# Patient Record
(demographics unavailable — no encounter records)

---

## 2024-11-12 NOTE — HISTORY OF PRESENT ILLNESS
[de-identified] : Update 11/12/24: f/u CRS s/p FESS 10/30/24. Pathology: no fungus identified, chronic sinusitis with polyp formation, heavy mucus, negative for malignancy. Using saline irrigations BID. Has intermittent headache, taking tylenol. Sense of smell recovered mostly.  68F referred by Dr. Shrestha for CRS. She has history of sinus issues, worsened acutely in the past 3-5 months. She reports using flonase and in the past using DDM without help. CT completed showing pansinusitis. She has also completed antibiotics and steroids x 2 without relief. Reports sinus pain and pressure, swelling on the right side and headaches. Denies anterior rhinorrhea, PND, anosmia. History of recurrent sinus infections treated with antibiotics and feels better. Former smoker, stopped 8 years prior. No recent dental work. No afrin use >1 year.

## 2024-11-12 NOTE — PROCEDURE
[FreeTextEntry6] : Nasal Endoscopy: Bilateral nasal cavity debridement (CPT 29721)   Indication: post op FESS   Procedure: There was expected post operative inflammation in both the right and left nasal cavity. Thick mucoid secretions and blood clot were suctioned.   Left: The septum is midline The inferior turbinate was well reduced/outfractured The MT medialized in good position The max is clear The sphenoid is clear Ethmoid with mild edema along skull base The frontal outflow with thick debris, suctioned   Right: The septum is midline The inferior turbinate was well reduced/outfractured The MT medialized The max clear Sphenoid clear Ethmoid with minimal mucus, suctioned The frontal outflow tract was patent. Thick mucus suctioned

## 2024-11-12 NOTE — PROCEDURE
[FreeTextEntry6] : Nasal Endoscopy: Bilateral nasal cavity debridement (CPT 23648)   Indication: post op FESS   Procedure: There was expected post operative inflammation in both the right and left nasal cavity. Thick mucoid secretions and blood clot were suctioned.   Left: The septum is midline The inferior turbinate was well reduced/outfractured The MT medialized in good position The max is clear The sphenoid is clear Ethmoid with mild edema along skull base The frontal outflow with thick debris, suctioned   Right: The septum is midline The inferior turbinate was well reduced/outfractured The MT medialized The max clear Sphenoid clear Ethmoid with minimal mucus, suctioned The frontal outflow tract was patent. Thick mucus suctioned

## 2024-11-12 NOTE — HISTORY OF PRESENT ILLNESS
[de-identified] : Update 11/12/24: f/u CRS s/p FESS 10/30/24. Pathology: no fungus identified, chronic sinusitis with polyp formation, heavy mucus, negative for malignancy. Using saline irrigations BID. Has intermittent headache, taking tylenol. Sense of smell recovered mostly.  68F referred by Dr. Shrestha for CRS. She has history of sinus issues, worsened acutely in the past 3-5 months. She reports using flonase and in the past using DDM without help. CT completed showing pansinusitis. She has also completed antibiotics and steroids x 2 without relief. Reports sinus pain and pressure, swelling on the right side and headaches. Denies anterior rhinorrhea, PND, anosmia. History of recurrent sinus infections treated with antibiotics and feels better. Former smoker, stopped 8 years prior. No recent dental work. No afrin use >1 year.

## 2024-11-12 NOTE — REASON FOR VISIT
[Subsequent Evaluation] : a subsequent evaluation for [FreeTextEntry2] : referred by Dr. Shrestha for chronic sinusitis

## 2024-12-03 NOTE — HISTORY OF PRESENT ILLNESS
[de-identified] : Update 12/3/24: f/u CRS s/p FESS 10/30/24. Previously started on budesonide irrigations BID. Reports headaches and congestion worse on R side starting early this week. Sense of smell has still not returned. Has occasion. nasal congestion at night and uses flonase with relief.  Update 11/12/24: f/u CRS s/p FESS 10/30/24. Pathology: no fungus identified, chronic sinusitis with polyp formation, heavy mucus, negative for malignancy. Using saline irrigations BID. Has intermittent headache, taking tylenol. Sense of smell recovered mostly.  68F referred by Dr. Shrestha for CRS. She has history of sinus issues, worsened acutely in the past 3-5 months. She reports using flonase and in the past using DDM without help. CT completed showing pansinusitis. She has also completed antibiotics and steroids x 2 without relief. Reports sinus pain and pressure, swelling on the right side and headaches. Denies anterior rhinorrhea, PND, anosmia. History of recurrent sinus infections treated with antibiotics and feels better. Former smoker, stopped 8 years prior. No recent dental work. No afrin use >1 year.

## 2024-12-03 NOTE — ASSESSMENT
[FreeTextEntry1] : 68F presenting with CRS s/p FESS 10/30/24, now with acute sinusitis on the right.

## 2024-12-03 NOTE — PROCEDURE
[FreeTextEntry6] : Nasal Endoscopy: Bilateral nasal cavity debridement (CPT 58628)   Indication: post op FESS   Procedure: There was expected post operative inflammation in both the right and left nasal cavity. Acute sinusitis in the right.   Left: The septum is midline The inferior turbinate was well reduced/outfractured The MT medialized in good position The max is clear The sphenoid is clear Ethmoid with mild edema along skull base The frontal outflow patent   Right: The septum is midline The inferior turbinate was well reduced/outfractured The MT medialized The max with thick yellow frothy mucus, cultured. Mucosal edema Ethmoid with edema

## 2024-12-03 NOTE — PLAN
[TextEntry] : We will start antibiotics, I will call with the results of the culture if the antibiotics need to be changed. We will hold off on steroids for now.   Cont budesonide irrigations.  Follow up in 4-6 weeks.

## 2024-12-03 NOTE — PROCEDURE
[FreeTextEntry6] : Nasal Endoscopy: Bilateral nasal cavity debridement (CPT 11131)   Indication: post op FESS   Procedure: There was expected post operative inflammation in both the right and left nasal cavity. Acute sinusitis in the right.   Left: The septum is midline The inferior turbinate was well reduced/outfractured The MT medialized in good position The max is clear The sphenoid is clear Ethmoid with mild edema along skull base The frontal outflow patent   Right: The septum is midline The inferior turbinate was well reduced/outfractured The MT medialized The max with thick yellow frothy mucus, cultured. Mucosal edema Ethmoid with edema

## 2024-12-03 NOTE — HISTORY OF PRESENT ILLNESS
[de-identified] : Update 12/3/24: f/u CRS s/p FESS 10/30/24. Previously started on budesonide irrigations BID. Reports headaches and congestion worse on R side starting early this week. Sense of smell has still not returned. Has occasion. nasal congestion at night and uses flonase with relief.  Update 11/12/24: f/u CRS s/p FESS 10/30/24. Pathology: no fungus identified, chronic sinusitis with polyp formation, heavy mucus, negative for malignancy. Using saline irrigations BID. Has intermittent headache, taking tylenol. Sense of smell recovered mostly.  68F referred by Dr. Shrestha for CRS. She has history of sinus issues, worsened acutely in the past 3-5 months. She reports using flonase and in the past using DDM without help. CT completed showing pansinusitis. She has also completed antibiotics and steroids x 2 without relief. Reports sinus pain and pressure, swelling on the right side and headaches. Denies anterior rhinorrhea, PND, anosmia. History of recurrent sinus infections treated with antibiotics and feels better. Former smoker, stopped 8 years prior. No recent dental work. No afrin use >1 year.

## 2024-12-31 NOTE — HISTORY OF PRESENT ILLNESS
[de-identified] : Update 12/31/24: f/u CRS s/p FESS 10/30/24. Using budesonide irrigations BID. Last visit had R>L sinusitis, was started on cipro for culture showing pseudomonas. She was only able to take medication for 5.5 days as it caused dizziness/nausea. She also reports L ear pressure, has not popped after airplane descent. She has right forehead pressure.  Update 12/3/24: f/u CRS s/p FESS 10/30/24. Previously started on budesonide irrigations BID. Reports headaches and congestion worse on R side starting early this week. Sense of smell has still not returned. Has occasion. nasal congestion at night and uses flonase with relief.  Update 11/12/24: f/u CRS s/p FESS 10/30/24. Pathology: no fungus identified, chronic sinusitis with polyp formation, heavy mucus, negative for malignancy. Using saline irrigations BID. Has intermittent headache, taking tylenol. Sense of smell recovered mostly.  68F referred by Dr. Shrestha for CRS. She has history of sinus issues, worsened acutely in the past 3-5 months. She reports using flonase and in the past using DDM without help. CT completed showing pansinusitis. She has also completed antibiotics and steroids x 2 without relief. Reports sinus pain and pressure, swelling on the right side and headaches. Denies anterior rhinorrhea, PND, anosmia. History of recurrent sinus infections treated with antibiotics and feels better. Former smoker, stopped 8 years prior. No recent dental work. No afrin use >1 year.

## 2024-12-31 NOTE — PLAN
[TextEntry] : We will send off the culture and I will call her with the results of the culture. We will call with culture directed PO and irrigated abx.   Continue budesonide irrigations BID.

## 2024-12-31 NOTE — PROCEDURE
[Anterior rhinoscopy insufficient to account for symptoms] : anterior rhinoscopy insufficient to account for symptoms [None] : none [Rigid Endoscope] : examined with a rigid endoscope [de-identified] : Neo Guerrero [FreeTextEntry6] : Nasal Endoscopy: Bilateral nasal cavity debridement (CPT 01811)   Indication: post op FESS   Procedure: There was expected post operative inflammation in both the right and left nasal cavity. Acute sinusitis in the right.   Left: The septum is midline The inferior turbinate was well reduced/outfractured The MT medialized in good position The max is clear The sphenoid is clear Ethmoid with edema along skull base The frontal outflow not well visualized    Right: The septum is midline The inferior turbinate was well reduced/outfractured The MT medialized The max with thick yellow mucus, cultured. Mucosal edema Ethmoid with edema Sphenoid and frontal not well visualized

## 2025-02-17 NOTE — PLAN
[TextEntry] : Cultures again taken today.   Will call with cultures. Will consider either a prolonged course of PO abx and changed irrigated abx or IV abx.

## 2025-02-17 NOTE — HISTORY OF PRESENT ILLNESS
[de-identified] : Update 2/17/25: f/u CRS s/p FESS 10/30/24. Using budesonide irrigations BID, completed levaquin irrigations and PO levaquin x7 days. Still reports poor sense of smell and taste, R frontal pain. Previous culture from 12/31 growing pseudomonas.  Update 12/31/24: f/u CRS s/p FESS 10/30/24. Using budesonide irrigations BID. Last visit had R>L sinusitis, was started on cipro for culture showing pseudomonas. She was only able to take medication for 5.5 days as it caused dizziness/nausea. She also reports L ear pressure, has not popped after airplane descent. She has right forehead pressure.  Update 12/3/24: f/u CRS s/p FESS 10/30/24. Previously started on budesonide irrigations BID. Reports headaches and congestion worse on R side starting early this week. Sense of smell has still not returned. Has occasion. nasal congestion at night and uses flonase with relief.  Update 11/12/24: f/u CRS s/p FESS 10/30/24. Pathology: no fungus identified, chronic sinusitis with polyp formation, heavy mucus, negative for malignancy. Using saline irrigations BID. Has intermittent headache, taking tylenol. Sense of smell recovered mostly.  68F referred by Dr. Shrestha for CRS. She has history of sinus issues, worsened acutely in the past 3-5 months. She reports using flonase and in the past using DDM without help. CT completed showing pansinusitis. She has also completed antibiotics and steroids x 2 without relief. Reports sinus pain and pressure, swelling on the right side and headaches. Denies anterior rhinorrhea, PND, anosmia. History of recurrent sinus infections treated with antibiotics and feels better. Former smoker, stopped 8 years prior. No recent dental work. No afrin use >1 year.

## 2025-02-17 NOTE — PROCEDURE
[Anterior rhinoscopy insufficient to account for symptoms] : anterior rhinoscopy insufficient to account for symptoms [None] : none [Rigid Endoscope] : examined with a rigid endoscope [de-identified] : Neo Guerrero [de-identified] : Nasal Endoscopy: Bilateral nasal cavity debridement (CPT 92948)   Indication: post op FESS   Procedure: There was expected post operative inflammation in both the right and left nasal cavity. Acute sinusitis in the right.   Left: The septum is midline The inferior turbinate was well reduced/outfractured The MT medialized in good position The max is clear The sphenoid with thick mucus, cultured Ethmoid with edema along skull base The frontal outflow not well visualized    Right: The septum is midline The inferior turbinate was well reduced/outfractured The MT medialized The max clear Ethmoid with edema Sphenoid with edema and frontal not well visualized

## 2025-03-18 NOTE — HISTORY OF PRESENT ILLNESS
[de-identified] : Update 3/18/25: f/u CRS s/p FESS 10/30/24. Using budesonide irrigations BID. Started on levaquin 500mg qd x 3 weeks. Prior culture still with pseudomonas. She reports joint pain. She otherwise reports that sinuses are feeling slightly better, but can't tell if clear or not.  Update 2/17/25: f/u CRS s/p FESS 10/30/24. Using budesonide irrigations BID, completed levaquin irrigations and PO levaquin x7 days. Still reports poor sense of smell and taste, R frontal pain. Previous culture from 12/31 growing pseudomonas.  Update 12/31/24: f/u CRS s/p FESS 10/30/24. Using budesonide irrigations BID. Last visit had R>L sinusitis, was started on cipro for culture showing pseudomonas. She was only able to take medication for 5.5 days as it caused dizziness/nausea. She also reports L ear pressure, has not popped after airplane descent. She has right forehead pressure.  Update 12/3/24: f/u CRS s/p FESS 10/30/24. Previously started on budesonide irrigations BID. Reports headaches and congestion worse on R side starting early this week. Sense of smell has still not returned. Has occasion. nasal congestion at night and uses flonase with relief.  Update 11/12/24: f/u CRS s/p FESS 10/30/24. Pathology: no fungus identified, chronic sinusitis with polyp formation, heavy mucus, negative for malignancy. Using saline irrigations BID. Has intermittent headache, taking tylenol. Sense of smell recovered mostly.  68F referred by Dr. Shrestha for CRS. She has history of sinus issues, worsened acutely in the past 3-5 months. She reports using flonase and in the past using DDM without help. CT completed showing pansinusitis. She has also completed antibiotics and steroids x 2 without relief. Reports sinus pain and pressure, swelling on the right side and headaches. Denies anterior rhinorrhea, PND, anosmia. History of recurrent sinus infections treated with antibiotics and feels better. Former smoker, stopped 8 years prior. No recent dental work. No afrin use >1 year.

## 2025-03-18 NOTE — HISTORY OF PRESENT ILLNESS
[de-identified] : Update 3/18/25: f/u CRS s/p FESS 10/30/24. Using budesonide irrigations BID. Started on levaquin 500mg qd x 3 weeks. Prior culture still with pseudomonas. She reports joint pain. She otherwise reports that sinuses are feeling slightly better, but can't tell if clear or not.  Update 2/17/25: f/u CRS s/p FESS 10/30/24. Using budesonide irrigations BID, completed levaquin irrigations and PO levaquin x7 days. Still reports poor sense of smell and taste, R frontal pain. Previous culture from 12/31 growing pseudomonas.  Update 12/31/24: f/u CRS s/p FESS 10/30/24. Using budesonide irrigations BID. Last visit had R>L sinusitis, was started on cipro for culture showing pseudomonas. She was only able to take medication for 5.5 days as it caused dizziness/nausea. She also reports L ear pressure, has not popped after airplane descent. She has right forehead pressure.  Update 12/3/24: f/u CRS s/p FESS 10/30/24. Previously started on budesonide irrigations BID. Reports headaches and congestion worse on R side starting early this week. Sense of smell has still not returned. Has occasion. nasal congestion at night and uses flonase with relief.  Update 11/12/24: f/u CRS s/p FESS 10/30/24. Pathology: no fungus identified, chronic sinusitis with polyp formation, heavy mucus, negative for malignancy. Using saline irrigations BID. Has intermittent headache, taking tylenol. Sense of smell recovered mostly.  68F referred by Dr. Shrestha for CRS. She has history of sinus issues, worsened acutely in the past 3-5 months. She reports using flonase and in the past using DDM without help. CT completed showing pansinusitis. She has also completed antibiotics and steroids x 2 without relief. Reports sinus pain and pressure, swelling on the right side and headaches. Denies anterior rhinorrhea, PND, anosmia. History of recurrent sinus infections treated with antibiotics and feels better. Former smoker, stopped 8 years prior. No recent dental work. No afrin use >1 year.

## 2025-03-18 NOTE — PROCEDURE
[Anterior rhinoscopy insufficient to account for symptoms] : anterior rhinoscopy insufficient to account for symptoms [None] : none [Rigid Endoscope] : examined with a rigid endoscope [de-identified] : Neo Guerrero [de-identified] : Nasal Endoscopy: Bilateral nasal cavity debridement (CPT 25478)   Indication: post op FESS   Procedure: There was expected post operative inflammation in both the right and left nasal cavity. Acute sinusitis in the right.   Left: The septum is midline The inferior turbinate was well reduced/outfractured The MT medialized in good position The max is clear The sphenoid with clear  Minimal yellow mucus in ethmoid cavity, cultured, small polyp at anterior ethmoid  The frontal outflow not well visualized    Right: The septum is midline The inferior turbinate was well reduced/outfractured The MT medialized The max with minimal yellow mucus along floor  Ethmoid clear Sphenoid clear frontal not well visualized

## 2025-03-18 NOTE — PROCEDURE
[Anterior rhinoscopy insufficient to account for symptoms] : anterior rhinoscopy insufficient to account for symptoms [None] : none [Rigid Endoscope] : examined with a rigid endoscope [de-identified] : Neo Guerrero [de-identified] : Nasal Endoscopy: Bilateral nasal cavity debridement (CPT 64778)   Indication: post op FESS   Procedure: There was expected post operative inflammation in both the right and left nasal cavity. Acute sinusitis in the right.   Left: The septum is midline The inferior turbinate was well reduced/outfractured The MT medialized in good position The max is clear The sphenoid with clear  Minimal yellow mucus in ethmoid cavity, cultured, small polyp at anterior ethmoid  The frontal outflow not well visualized    Right: The septum is midline The inferior turbinate was well reduced/outfractured The MT medialized The max with minimal yellow mucus along floor  Ethmoid clear Sphenoid clear frontal not well visualized

## 2025-04-18 NOTE — HISTORY OF PRESENT ILLNESS
[de-identified] :  EVANS TEE  is a 69 year old female who presents for evaluation of chronic rhinosinusitis. Patient was referred by Dr Guerrero.  Pseudomonas after FESS   CHRONIC RHINOSINUSITIS Nasal polyposis- YES - She has had sinus symptoms since her 30s, she was told she had polyps. Her symptoms got worse in the spring of 2024. Patient has had 1 sinus surgeries, in 10/30/24.. Over the last year she received 5 courses of antibiotics and 3 courses of steroids. Her sense of smell is absent since surgery, her sense of smell was fine before the surgery. Her sense of taste is altered. Nasal steroids: budesonide 0.5 bid. Also putting levofloxacin into the sinus washes. = Path:  Chronic sinusitis with polyp formation; Heavy mucus  - She was tested for allergies a long time ago but cant recall what the result was. She takes zyrtec in the spring for seasonal allergic symptoms and it helps.  - she gets symptoms with cat and dog exposure; symptoms are more severe with cats- cause asthma attack     ASTHMA. - She has Albuterol  for PRN use, needs it 2/week in the spring, not as frequent other times of the year - in her 40s-50s- took Advair, then eventually tapered and stopped in part because medication was very expensive She was diagnosed with asthma at about 30 years of age  - no ER visits for asthma - she is a former smoker, quit 2016, smoked for ~ 40 years - about  she saw a pulmonologist who said that there was no sign of COPD/emphysema   No history of NSAIDs reactions. - She is on Aspirin 81 mg daily  - Last Advil- ~ 1 week ago   History of respiratory symptoms after alcohol ingestion: - red wine causes more nasal congestion next day, but no immediate symptoms    History of hives: only if dog licks her   History of GI bleeding or ulcers:  - had gastric ulcer at ~ 51 yo   Race/Ethnicity:  white       Labs from 3/18/25 reviewed, done at [facility]: - Absolute eosinophil count - 800  Immune work up revealed normal quantitative immunoglobulin levels, protective specific antibody titers to tetanus, HIB-3.89, normal CH50 levels. Laboratories are SIGNIFICANT for suboptimal and Strep. pneumoniae,.      - IgG-854, normal IgG subclasses   - MBL-467 - ACE-36, N

## 2025-04-18 NOTE — REASON FOR VISIT
[Home] : at home, [unfilled] , at the time of the visit. [Medical Office: ([facility])___] : at the medical office located in  [Telehealth (audio & video)] : This visit was provided via telehealth using real-time 2-way audio visual technology. [Verbal consent obtained from patient] : the patient, [unfilled]

## 2025-04-18 NOTE — REASON FOR VISIT
[Home] : at home, [unfilled] , at the time of the visit. [Medical Office: ([facility])___] : at the medical office located in  [Telehealth (audio & video)] : This visit was provided via telehealth using real-time 2-way audio visual technology. [Verbal consent obtained from patient] : the patient, [unfilled]

## 2025-04-18 NOTE — HISTORY OF PRESENT ILLNESS
[de-identified] :  EVANS TEE  is a 69 year old female who presents for evaluation of chronic rhinosinusitis. Patient was referred by Dr Guerrero.  Pseudomonas after FESS   CHRONIC RHINOSINUSITIS Nasal polyposis- YES - She has had sinus symptoms since her 30s, she was told she had polyps. Her symptoms got worse in the spring of 2024. Patient has had 1 sinus surgeries, in 10/30/24.. Over the last year she received 5 courses of antibiotics and 3 courses of steroids. Her sense of smell is absent since surgery, her sense of smell was fine before the surgery. Her sense of taste is altered. Nasal steroids: budesonide 0.5 bid. Also putting levofloxacin into the sinus washes. = Path:  Chronic sinusitis with polyp formation; Heavy mucus  - She was tested for allergies a long time ago but cant recall what the result was. She takes zyrtec in the spring for seasonal allergic symptoms and it helps.  - she gets symptoms with cat and dog exposure; symptoms are more severe with cats- cause asthma attack     ASTHMA. - She has Albuterol  for PRN use, needs it 2/week in the spring, not as frequent other times of the year - in her 40s-50s- took Advair, then eventually tapered and stopped in part because medication was very expensive She was diagnosed with asthma at about 30 years of age  - no ER visits for asthma - she is a former smoker, quit 2016, smoked for ~ 40 years - about  she saw a pulmonologist who said that there was no sign of COPD/emphysema   No history of NSAIDs reactions. - She is on Aspirin 81 mg daily  - Last Advil- ~ 1 week ago   History of respiratory symptoms after alcohol ingestion: - red wine causes more nasal congestion next day, but no immediate symptoms    History of hives: only if dog licks her   History of GI bleeding or ulcers:  - had gastric ulcer at ~ 49 yo   Race/Ethnicity:  white       Labs from 3/18/25 reviewed, done at [facility]: - Absolute eosinophil count - 800  Immune work up revealed normal quantitative immunoglobulin levels, protective specific antibody titers to tetanus, HIB-3.89, normal CH50 levels. Laboratories are SIGNIFICANT for suboptimal and Strep. pneumoniae,.      - IgG-854, normal IgG subclasses   - MBL-467 - ACE-36, N

## 2025-04-29 NOTE — HISTORY OF PRESENT ILLNESS
[de-identified] : Update 4/29/25: f/u CRS s/p FESS 10/30/24. Continues to use levaquin irrigations and budesonide. Last culture without growth. She overall is feeling improved. She reports that her allergies have caused nasal congestion. Sense of smell waxing and waning  Update 4/4/25: f/u CRS s/p FESS 10/30/24. Continues to use levaquin irrigations. Last seen 2 weeks ago. Culture from L max 3/18 showed no growth. Feeling overall stable. Taking zyrtec regularly for her AR. Using flonase on occasion. With L frontal DOMINGUEZ intermittently.  Update 3/18/25: f/u CRS s/p FESS 10/30/24. Using budesonide irrigations BID. Started on levaquin 500mg qd x 3 weeks. Prior culture still with pseudomonas. She reports joint pain. She otherwise reports that sinuses are feeling slightly better, but can't tell if clear or not.  Update 2/17/25: f/u CRS s/p FESS 10/30/24. Using budesonide irrigations BID, completed levaquin irrigations and PO levaquin x7 days. Still reports poor sense of smell and taste, R frontal pain. Previous culture from 12/31 growing pseudomonas.  Update 12/31/24: f/u CRS s/p FESS 10/30/24. Using budesonide irrigations BID. Last visit had R>L sinusitis, was started on cipro for culture showing pseudomonas. She was only able to take medication for 5.5 days as it caused dizziness/nausea. She also reports L ear pressure, has not popped after airplane descent. She has right forehead pressure.  Update 12/3/24: f/u CRS s/p FESS 10/30/24. Previously started on budesonide irrigations BID. Reports headaches and congestion worse on R side starting early this week. Sense of smell has still not returned. Has occasion. nasal congestion at night and uses flonase with relief.  Update 11/12/24: f/u CRS s/p FESS 10/30/24. Pathology: no fungus identified, chronic sinusitis with polyp formation, heavy mucus, negative for malignancy. Using saline irrigations BID. Has intermittent headache, taking tylenol. Sense of smell recovered mostly.  68F referred by Dr. Shrestha for CRS. She has history of sinus issues, worsened acutely in the past 3-5 months. She reports using flonase and in the past using DDM without help. CT completed showing pansinusitis. She has also completed antibiotics and steroids x 2 without relief. Reports sinus pain and pressure, swelling on the right side and headaches. Denies anterior rhinorrhea, PND, anosmia. History of recurrent sinus infections treated with antibiotics and feels better. Former smoker, stopped 8 years prior. No recent dental work. No afrin use >1 year.

## 2025-04-29 NOTE — PROCEDURE
[Anterior rhinoscopy insufficient to account for symptoms] : anterior rhinoscopy insufficient to account for symptoms [None] : none [Rigid Endoscope] : examined with a rigid endoscope [de-identified] : Neo Guerrero [de-identified] : Nasal Endoscopy: Bilateral nasal cavity   Indication: post op FESS     Left: The septum is midline The inferior turbinate was well reduced/outfractured The MT medialized in good position The max is clear, no mucopus Ethmoid with minimal thick mucus posteriorly, cultured today ethmoid edema resolved, small amount of edema at posterior ethmoid at face of sphenoid sphenoid clear The frontal outflow patent  Right: The septum is midline The inferior turbinate was well reduced/outfractured The MT medialized The max with small amount of edema Ethmoid with residual edema, removed with suction Sphenoid with residual edema frontal not well visualized 2/2 to edema

## 2025-04-29 NOTE — ASSESSMENT
[FreeTextEntry1] : 69F presenting with CRS s/p FESS 10/30/24, with persistent pseudmonas infection appears to have resolved.

## 2025-04-29 NOTE — PLAN
[TextEntry] : No evidence of active pseudomonal infection today. The left sided sinuses are nearly completely clear. There is polyp in the R nasal cavity, but the left sided infeciton appears to be resolved. We reviewed the utility of discussing with ID. We reivewed the role of pulm. At this point, I do not want to trial off irrigated abx as her right side is not yet optimized and if the infeciton returns, I would not want to start from an already imperfect sinus cavity. We will switch to irrigated zosyn.   Will fu culture taken today although little suspicion for infection.  Cont fu with Dr. Mares.  Will prescribe an inhaler. We reviewed d/cing prior to Dr. Mares appt in 2 months.   Follow up in 4-6 weeks.

## 2025-06-10 NOTE — PLAN
[TextEntry] : No evidence of active pseudomonal infection today. Cont budesonide irrigations.  Complete zosyn irrigations. Nothing to culture today.  Follow up in 6 weeks.

## 2025-06-10 NOTE — ASSESSMENT
[FreeTextEntry1] : 69F presenting with CRS s/p FESS 10/30/24, with persistent pseudomonas infection, now appears to have resolved

## 2025-06-10 NOTE — PROCEDURE
[Anterior rhinoscopy insufficient to account for symptoms] : anterior rhinoscopy insufficient to account for symptoms [None] : none [Rigid Endoscope] : examined with a rigid endoscope [de-identified] : Neo Guerrero [de-identified] : Nasal Endoscopy: Bilateral nasal cavity   Indication: post op FESS   Left: The septum is midline The inferior turbinate was well reduced/outfractured The MT medialized in good position The max is clear, no mucopus Ethmoid clear sphenoid clear The frontal outflow with edema, no mucopus  Right: The septum is midline The inferior turbinate was well reduced/outfractured The MT medialized The max clear Ethmoid clear Sphenoid with small polyp along lamina Frontal clear with minimal edema

## 2025-06-10 NOTE — HISTORY OF PRESENT ILLNESS
[de-identified] : Update 6/10/25: f/u CRS s/p FESS 10/30/24. Using budesonide irrigations, Completing second month of zosyn irrigations this week. She reports that she has been feeling well. Good sense of smell. Breathing well.   Update 4/29/25: f/u CRS s/p FESS 10/30/24. Continues to use levaquin irrigations and budesonide. Last culture without growth. She overall is feeling improved. She reports that her allergies have caused nasal congestion. Sense of smell waxing and waning  Update 4/4/25: f/u CRS s/p FESS 10/30/24. Continues to use levaquin irrigations. Last seen 2 weeks ago. Culture from L max 3/18 showed no growth. Feeling overall stable. Taking zyrtec regularly for her AR. Using flonase on occasion. With L frontal DOMINGUEZ intermittently.  Update 3/18/25: f/u CRS s/p FESS 10/30/24. Using budesonide irrigations BID. Started on levaquin 500mg qd x 3 weeks. Prior culture still with pseudomonas. She reports joint pain. She otherwise reports that sinuses are feeling slightly better, but can't tell if clear or not.  Update 2/17/25: f/u CRS s/p FESS 10/30/24. Using budesonide irrigations BID, completed levaquin irrigations and PO levaquin x7 days. Still reports poor sense of smell and taste, R frontal pain. Previous culture from 12/31 growing pseudomonas.  Update 12/31/24: f/u CRS s/p FESS 10/30/24. Using budesonide irrigations BID. Last visit had R>L sinusitis, was started on cipro for culture showing pseudomonas. She was only able to take medication for 5.5 days as it caused dizziness/nausea. She also reports L ear pressure, has not popped after airplane descent. She has right forehead pressure.  Update 12/3/24: f/u CRS s/p FESS 10/30/24. Previously started on budesonide irrigations BID. Reports headaches and congestion worse on R side starting early this week. Sense of smell has still not returned. Has occasion. nasal congestion at night and uses flonase with relief.  Update 11/12/24: f/u CRS s/p FESS 10/30/24. Pathology: no fungus identified, chronic sinusitis with polyp formation, heavy mucus, negative for malignancy. Using saline irrigations BID. Has intermittent headache, taking tylenol. Sense of smell recovered mostly.  68F referred by Dr. Shrestha for CRS. She has history of sinus issues, worsened acutely in the past 3-5 months. She reports using flonase and in the past using DDM without help. CT completed showing pansinusitis. She has also completed antibiotics and steroids x 2 without relief. Reports sinus pain and pressure, swelling on the right side and headaches. Denies anterior rhinorrhea, PND, anosmia. History of recurrent sinus infections treated with antibiotics and feels better. Former smoker, stopped 8 years prior. No recent dental work. No afrin use >1 year.

## 2025-07-29 NOTE — PLAN
[TextEntry] : Continue budesonide irrigations BID, discussed positioning.   We discussed possibility of draf III however not bothering her enough at this point.  Follow up in 3 months.

## 2025-07-29 NOTE — HISTORY OF PRESENT ILLNESS
[de-identified] : Update 7/29/25: f/u CRS s/p FESS 10/30/24. Using budesonide irrigations QD, completed zosyn irrigations. Reports intermittent headaches and facial pressure above L eye which can last hours at a time, worse at night.   Update 6/10/25: f/u CRS s/p FESS 10/30/24. Using budesonide irrigations, Completing second month of zosyn irrigations this week. She reports that she has been feeling well. Good sense of smell. Breathing well.   Update 4/29/25: f/u CRS s/p FESS 10/30/24. Continues to use levaquin irrigations and budesonide. Last culture without growth. She overall is feeling improved. She reports that her allergies have caused nasal congestion. Sense of smell waxing and waning  Update 4/4/25: f/u CRS s/p FESS 10/30/24. Continues to use levaquin irrigations. Last seen 2 weeks ago. Culture from L max 3/18 showed no growth. Feeling overall stable. Taking zyrtec regularly for her AR. Using flonase on occasion. With L frontal DOMINGUEZ intermittently.  Update 3/18/25: f/u CRS s/p FESS 10/30/24. Using budesonide irrigations BID. Started on levaquin 500mg qd x 3 weeks. Prior culture still with pseudomonas. She reports joint pain. She otherwise reports that sinuses are feeling slightly better, but can't tell if clear or not.  Update 2/17/25: f/u CRS s/p FESS 10/30/24. Using budesonide irrigations BID, completed levaquin irrigations and PO levaquin x7 days. Still reports poor sense of smell and taste, R frontal pain. Previous culture from 12/31 growing pseudomonas.  Update 12/31/24: f/u CRS s/p FESS 10/30/24. Using budesonide irrigations BID. Last visit had R>L sinusitis, was started on cipro for culture showing pseudomonas. She was only able to take medication for 5.5 days as it caused dizziness/nausea. She also reports L ear pressure, has not popped after airplane descent. She has right forehead pressure.  Update 12/3/24: f/u CRS s/p FESS 10/30/24. Previously started on budesonide irrigations BID. Reports headaches and congestion worse on R side starting early this week. Sense of smell has still not returned. Has occasion. nasal congestion at night and uses flonase with relief.  Update 11/12/24: f/u CRS s/p FESS 10/30/24. Pathology: no fungus identified, chronic sinusitis with polyp formation, heavy mucus, negative for malignancy. Using saline irrigations BID. Has intermittent headache, taking tylenol. Sense of smell recovered mostly.  68F referred by Dr. Shrestha for CRS. She has history of sinus issues, worsened acutely in the past 3-5 months. She reports using flonase and in the past using DDM without help. CT completed showing pansinusitis. She has also completed antibiotics and steroids x 2 without relief. Reports sinus pain and pressure, swelling on the right side and headaches. Denies anterior rhinorrhea, PND, anosmia. History of recurrent sinus infections treated with antibiotics and feels better. Former smoker, stopped 8 years prior. No recent dental work. No afrin use >1 year.

## 2025-07-29 NOTE — PROCEDURE
[Anterior rhinoscopy insufficient to account for symptoms] : anterior rhinoscopy insufficient to account for symptoms [None] : none [Rigid Endoscope] : examined with a rigid endoscope [de-identified] : Neo Guerrero [de-identified] : Nasal Endoscopy: Bilateral nasal cavity   Indication: post op FESS   Left: The septum is midline The inferior turbinate was well reduced/outfractured The MT medialized in good position The max is clear, no mucopus Ethmoid clear sphenoid clear The frontal outflow with edema, no mucopus  Right: The septum is midline The inferior turbinate was well reduced/outfractured The MT medialized The max clear Ethmoid clear Sphenoid clear Frontal clear with edema

## 2025-07-29 NOTE — ASSESSMENT
[FreeTextEntry1] : 69F presenting with CRS s/p FESS 10/30/24, with persistent pseudomonas infection, now appears to have resolved. Edema at b/l frontals.

## 2025-07-29 NOTE — HISTORY OF PRESENT ILLNESS
[de-identified] : Update 7/29/25: f/u CRS s/p FESS 10/30/24. Using budesonide irrigations QD, completed zosyn irrigations. Reports intermittent headaches and facial pressure above L eye which can last hours at a time, worse at night.   Update 6/10/25: f/u CRS s/p FESS 10/30/24. Using budesonide irrigations, Completing second month of zosyn irrigations this week. She reports that she has been feeling well. Good sense of smell. Breathing well.   Update 4/29/25: f/u CRS s/p FESS 10/30/24. Continues to use levaquin irrigations and budesonide. Last culture without growth. She overall is feeling improved. She reports that her allergies have caused nasal congestion. Sense of smell waxing and waning  Update 4/4/25: f/u CRS s/p FESS 10/30/24. Continues to use levaquin irrigations. Last seen 2 weeks ago. Culture from L max 3/18 showed no growth. Feeling overall stable. Taking zyrtec regularly for her AR. Using flonase on occasion. With L frontal DOMINGUEZ intermittently.  Update 3/18/25: f/u CRS s/p FESS 10/30/24. Using budesonide irrigations BID. Started on levaquin 500mg qd x 3 weeks. Prior culture still with pseudomonas. She reports joint pain. She otherwise reports that sinuses are feeling slightly better, but can't tell if clear or not.  Update 2/17/25: f/u CRS s/p FESS 10/30/24. Using budesonide irrigations BID, completed levaquin irrigations and PO levaquin x7 days. Still reports poor sense of smell and taste, R frontal pain. Previous culture from 12/31 growing pseudomonas.  Update 12/31/24: f/u CRS s/p FESS 10/30/24. Using budesonide irrigations BID. Last visit had R>L sinusitis, was started on cipro for culture showing pseudomonas. She was only able to take medication for 5.5 days as it caused dizziness/nausea. She also reports L ear pressure, has not popped after airplane descent. She has right forehead pressure.  Update 12/3/24: f/u CRS s/p FESS 10/30/24. Previously started on budesonide irrigations BID. Reports headaches and congestion worse on R side starting early this week. Sense of smell has still not returned. Has occasion. nasal congestion at night and uses flonase with relief.  Update 11/12/24: f/u CRS s/p FESS 10/30/24. Pathology: no fungus identified, chronic sinusitis with polyp formation, heavy mucus, negative for malignancy. Using saline irrigations BID. Has intermittent headache, taking tylenol. Sense of smell recovered mostly.  68F referred by Dr. Shrestha for CRS. She has history of sinus issues, worsened acutely in the past 3-5 months. She reports using flonase and in the past using DDM without help. CT completed showing pansinusitis. She has also completed antibiotics and steroids x 2 without relief. Reports sinus pain and pressure, swelling on the right side and headaches. Denies anterior rhinorrhea, PND, anosmia. History of recurrent sinus infections treated with antibiotics and feels better. Former smoker, stopped 8 years prior. No recent dental work. No afrin use >1 year.

## 2025-07-29 NOTE — PROCEDURE
[Anterior rhinoscopy insufficient to account for symptoms] : anterior rhinoscopy insufficient to account for symptoms [None] : none [Rigid Endoscope] : examined with a rigid endoscope [de-identified] : Neo Guerrero [de-identified] : Nasal Endoscopy: Bilateral nasal cavity   Indication: post op FESS   Left: The septum is midline The inferior turbinate was well reduced/outfractured The MT medialized in good position The max is clear, no mucopus Ethmoid clear sphenoid clear The frontal outflow with edema, no mucopus  Right: The septum is midline The inferior turbinate was well reduced/outfractured The MT medialized The max clear Ethmoid clear Sphenoid clear Frontal clear with edema